# Patient Record
Sex: FEMALE | Race: WHITE
[De-identification: names, ages, dates, MRNs, and addresses within clinical notes are randomized per-mention and may not be internally consistent; named-entity substitution may affect disease eponyms.]

---

## 2018-03-19 ENCOUNTER — HOSPITAL ENCOUNTER (OUTPATIENT)
Dept: HOSPITAL 88 - MRI | Age: 53
End: 2018-03-19
Attending: FAMILY MEDICINE
Payer: COMMERCIAL

## 2018-03-19 DIAGNOSIS — M51.86: ICD-10-CM

## 2018-03-19 DIAGNOSIS — M47.816: Primary | ICD-10-CM

## 2018-03-19 PROCEDURE — 72148 MRI LUMBAR SPINE W/O DYE: CPT

## 2018-03-19 NOTE — DIAGNOSTIC IMAGING REPORT
EXAMINATION: MRI of the lumbar spine without contrast  



HISTORY: Low back pain radiating on the side and front of the left lower

extremity with weakness, history of remote trauma.

COMPARISON: None.

TECHNIQUE: Sagittal T1, T2, STIR; axial T2 and proton density.  





FINDINGS:



It is assumed that there are 5 lumbar vertebrae.

    Curvature/Alignment: Normal lordosis. Mild levoscoliosis with apex at L4-5

    Vertebrae: No evidence of recent  fracture, infection, or neoplasm. Chronic

endplate degenerative changes mainly on the right side at L4-5.

    Conus: Normal, terminating at L1-L2

    Cauda equina: Unremarkable.  

    Lower thoracic: Unremarkable.

    Paraspinal soft tissues: Mild paraspinal structure atrophy



Degenerative changes:



    L1-L2: Unremarkable.



    L2-L3: Minimal symmetric disc bulge and facet arthrosis. Very minimal

retrolisthesis. No canal or foraminal stenosis



    L3-L4: Mild decreased disc height and T2 signal intensity, asymmetric to

left disc bulge, bilateral facet arthrosis. Minimal foraminal narrowing. No

nerve root compression



    L4-L5: Decreased disc height and T2 signal intensity, asymmetric to the

right disc osteophyte and right greater than left facet arthrosis. Mild right

foraminal stenoses without evidence of nerve root compression.



    L5-S1: Asymmetric to left disc osteophyte and facet arthrosis minimally on

the left. Narrowing of the left lateral recess. Moderate left foraminal

stenoses, with possible minimal displacement upon the exiting left L5 nerve

root.



IMPRESSION:



1.  Moderate degenerative foraminal stenosis on the left at L5-S1 as detailed

above.



2.  Mild degenerative narrowing of the left lateral recesses at L5-S1.



3.  Mild degenerative foraminal stenosis on the right at L4-L5.



Signed by: Dr. Brittni Cleveland M.D. on 3/19/2018 10:21 AM

## 2023-01-12 ENCOUNTER — HOSPITAL ENCOUNTER (OUTPATIENT)
Dept: HOSPITAL 88 - US | Age: 58
End: 2023-01-12
Attending: FAMILY MEDICINE
Payer: COMMERCIAL

## 2023-01-12 DIAGNOSIS — R74.8: Primary | ICD-10-CM

## 2023-01-12 PROCEDURE — 76705 ECHO EXAM OF ABDOMEN: CPT
